# Patient Record
Sex: MALE | Race: BLACK OR AFRICAN AMERICAN | NOT HISPANIC OR LATINO | Employment: FULL TIME | URBAN - METROPOLITAN AREA
[De-identification: names, ages, dates, MRNs, and addresses within clinical notes are randomized per-mention and may not be internally consistent; named-entity substitution may affect disease eponyms.]

---

## 2017-01-24 ENCOUNTER — HOSPITAL ENCOUNTER (EMERGENCY)
Facility: HOSPITAL | Age: 32
Discharge: HOME | End: 2017-01-24
Attending: FAMILY MEDICINE
Payer: MEDICAID

## 2017-01-24 VITALS
RESPIRATION RATE: 20 BRPM | WEIGHT: 270 LBS | HEIGHT: 70 IN | SYSTOLIC BLOOD PRESSURE: 128 MMHG | DIASTOLIC BLOOD PRESSURE: 87 MMHG | BODY MASS INDEX: 38.65 KG/M2 | HEART RATE: 89 BPM | TEMPERATURE: 99 F | OXYGEN SATURATION: 98 %

## 2017-01-24 DIAGNOSIS — J01.90 ACUTE SINUSITIS, RECURRENCE NOT SPECIFIED, UNSPECIFIED LOCATION: Primary | ICD-10-CM

## 2017-01-24 PROCEDURE — 63600175 PHARM REV CODE 636 W HCPCS: Performed by: FAMILY MEDICINE

## 2017-01-24 PROCEDURE — 99283 EMERGENCY DEPT VISIT LOW MDM: CPT | Mod: 25

## 2017-01-24 PROCEDURE — 96372 THER/PROPH/DIAG INJ SC/IM: CPT

## 2017-01-24 RX ORDER — DEXAMETHASONE SODIUM PHOSPHATE 4 MG/ML
8 INJECTION, SOLUTION INTRA-ARTICULAR; INTRALESIONAL; INTRAMUSCULAR; INTRAVENOUS; SOFT TISSUE
Status: COMPLETED | OUTPATIENT
Start: 2017-01-24 | End: 2017-01-24

## 2017-01-24 RX ORDER — LEVOFLOXACIN 500 MG/1
500 TABLET, FILM COATED ORAL DAILY
Qty: 7 TABLET | Refills: 0 | Status: SHIPPED | OUTPATIENT
Start: 2017-01-24 | End: 2017-01-31

## 2017-01-24 RX ORDER — AMOXICILLIN AND CLAVULANATE POTASSIUM 875; 125 MG/1; MG/1
1 TABLET, FILM COATED ORAL 2 TIMES DAILY
Qty: 20 TABLET | Refills: 0 | Status: SHIPPED | OUTPATIENT
Start: 2017-01-24 | End: 2017-01-24

## 2017-01-24 RX ADMIN — DEXAMETHASONE SODIUM PHOSPHATE 8 MG: 4 INJECTION, SOLUTION INTRAMUSCULAR; INTRAVENOUS at 01:01

## 2017-01-24 NOTE — ED PROVIDER NOTES
"Encounter Date: 1/24/2017       History     Chief Complaint   Patient presents with    URI     "I been having a cold for the last couple of days and I am so stopped up.  I am so congested. "     Review of patient's allergies indicates:  No Known Allergies  HPI Comments: Patient's a 31 year old black male comes in complaining of severe cold symptoms for "a couple of days".  Patient complains that he has nasal and sinus congestion, and that he is coughing up "cold"; by this she means yellow tinged mucus.  No hemoptysis.  Patient complains of sweats but did not take his temperature.  On closer questioning this is the fourth day of illness.    The history is provided by the patient.     Past Medical History   Diagnosis Date    Environmental allergies     Hypertension      No past medical history pertinent negatives.  History reviewed. No pertinent past surgical history.  History reviewed. No pertinent family history.  Social History   Substance Use Topics    Smoking status: Never Smoker    Smokeless tobacco: Never Used    Alcohol use Yes      Comment: tristen- one or so a day per patient     Review of Systems   Constitutional: Positive for chills and fatigue.   HENT: Positive for congestion, postnasal drip and sinus pressure.    Respiratory: Positive for cough. Negative for shortness of breath.    All other systems reviewed and are negative.      Physical Exam   Initial Vitals   BP Pulse Resp Temp SpO2   01/24/17 1204 01/24/17 1204 01/24/17 1204 01/24/17 1204 01/24/17 1204   124/92 91 17 99.3 °F (37.4 °C) 98 %     Physical Exam    Nursing note and vitals reviewed.  Constitutional: He appears well-developed and well-nourished. No distress.   HENT:   Head: Normocephalic.   Nose: Mucosal edema present. Left sinus exhibits maxillary sinus tenderness and frontal sinus tenderness.   Eyes: EOM are normal. Pupils are equal, round, and reactive to light.   Neck: Normal range of motion. Neck supple.   Cardiovascular: Normal " rate, regular rhythm and normal heart sounds.   Pulmonary/Chest: No respiratory distress.   Course breath sounds bilaterally.  Dullness or egophony.  Patient had a few scattered coarse wheezes but they clear with cough   Abdominal: Soft. Bowel sounds are normal. He exhibits no distension. There is no tenderness. There is no rebound.   Musculoskeletal: Normal range of motion.   Neurological: He is alert and oriented to person, place, and time.   Skin: Skin is warm and dry.   Psychiatric: He has a normal mood and affect.         ED Course   Procedures  Labs Reviewed - No data to display                            ED Course     Clinical Impression:   The encounter diagnosis was Acute sinusitis, recurrence not specified, unspecified location.          CHIO Rangel MD  01/24/17 9013

## 2017-01-24 NOTE — ED NOTES
"Upon entering room, beginning d/c information discussion, pt states "I am allergic to Amoxil" Informed MD and entered into chart  "

## 2017-01-24 NOTE — DISCHARGE INSTRUCTIONS
GET TUSSIN CF and take as directed for cough and congestion    Saline Nasal Spray every 3-4 hours while awake

## 2017-01-24 NOTE — ED AVS SNAPSHOT
[facility]  [address]  [place] [address]               Oh Mata   2017 12:52 PM   ED    Description:  Male : 1985   Department:  [facility]           Your Care was Coordinated By:     Provider Role From Juan Antonio Rangel MD Attending Provider 17 1257 --      Reason for Visit     URI           Diagnoses this Visit        Comments    Acute sinusitis, recurrence not specified, unspecified location    -  Primary       ED Disposition     ED Disposition Condition Comment    Discharge             To Do List           Follow-up Information     Schedule an appointment as soon as possible for a visit with Christiano Almeida Jr, MD.    Specialty:  Family Medicine    Why:  As needed, If symptoms worsen    Contact information:    [address] [place] [address] [phone]         These Medications        Disp Refills Start End    levoFLOXacin (LEVAQUIN) 500 MG tablet 7 tablet 0 2017    Take 1 tablet (500 mg total) by mouth once daily. - Oral      [facility]     [facility] -  Assistance  Registered nurses in the [facility] provide clinical advisement, health education, appointment booking, and other advisory services.  Call for this free service at [phone].             Medications           Message regarding Medications     Verify the changes and/or additions to your medication regime listed below are the same as discussed with your clinician today.  If any of these changes or additions are incorrect, please notify your healthcare provider.        START taking these NEW medications        Refills    levoFLOXacin (LEVAQUIN) 500 MG tablet 0    Sig: Take 1 tablet (500 mg total) by mouth once daily.    Class: Print    Route: Oral      These medications were administered today        Dose Freq    dexamethasone injection 8 mg 8 mg ED 1 Time    Sig: Inject 2 mLs (8  "mg total) into the muscle ED 1 Time.    Class: Normal    Route: Intramuscular           Verify that the below list of medications is an accurate representation of the medications you are currently taking.  If none reported, the list may be blank. If incorrect, please contact your healthcare provider. Carry this list with you in case of emergency.           Current Medications     atenolol (TENORMIN) 25 MG tablet Take 25 mg by mouth once daily.    acetaminophen (TYLENOL) 500 MG tablet Take 2 tablets (1,000 mg total) by mouth 3 (three) times daily as needed for Pain.    fluticasone (FLONASE) 50 mcg/actuation nasal spray 1 spray by Each Nare route 2 (two) times daily as needed for Rhinitis.    ibuprofen (ADVIL,MOTRIN) 200 MG tablet Take 2 tablets (400 mg total) by mouth every 6 (six) hours as needed for Pain.    levoFLOXacin (LEVAQUIN) 500 MG tablet Take 1 tablet (500 mg total) by mouth once daily.    loratadine-pseudoephedrine  mg (CLARITIN-D 24-HOUR)  mg per 24 hr tablet Take 1 tablet by mouth once daily.    meloxicam (MOBIC) 15 MG tablet Take 1 tablet (15 mg total) by mouth once daily. With first meal of day for inflammation           Clinical Reference Information           Your Vitals Were     BP Pulse Temp Resp Height Weight    124/92 (BP Location: Right arm, Patient Position: Sitting) 91 99.3 °F (37.4 °C) (Oral) 17 5' 10" (1.778 m) 122.5 kg (270 lb)    SpO2 BMI             98% 38.74 kg/m2         Allergies as of 1/24/2017        Reactions    Amoxicillin Rash      Immunizations Administered on Date of Encounter - 1/24/2017     None      ED Micro, Lab, POCT     None      ED Imaging Orders     None        Discharge Instructions       GET TUSSIN CF and take as directed for cough and congestion    Saline Nasal Spray every 3-4 hours while awake    [facility] Sign-Up     Activating your [facility] account is as easy as 1-2-3!     1) Visit [email or web address], select Sign Up Now, enter this activation code and your " date of birth, then select Next.  [email or web address]  Expires: 3/10/2017  1:40 PM      2) Create a username and password to use when you visit [facility] in the future and select a security question in case you lose your password and select Next.    3) Enter your e-mail address and click Sign Up!    Additional Information  If you have questions, please e-mail [email or web address] or call [phone] to talk to our [facility] staff. Remember, [facility] is NOT to be used for urgent needs. For medical emergencies, dial 911.          [facility] complies with applicable Federal civil rights laws and does not discriminate on the basis of race, color, national origin, age, disability, or sex.        Language Assistance Services     ATTENTION: Language assistance services are available, free of charge. Please call [phone].      ATENCIÓN: Si habla español, tiene a dhaliwal disposición servicios gratuitos de asistencia lingüística. Llame al [phone].     CLARISSE Ý: N?u b?n nói Ti?ng Vi?t, có các d?ch v? h? tr? ngôn ng? mi?n phí dành cho b?n. G?i s? [phone].